# Patient Record
Sex: MALE | URBAN - METROPOLITAN AREA
[De-identification: names, ages, dates, MRNs, and addresses within clinical notes are randomized per-mention and may not be internally consistent; named-entity substitution may affect disease eponyms.]

---

## 2023-06-06 ENCOUNTER — APPOINTMENT (RX ONLY)
Dept: URBAN - METROPOLITAN AREA CLINIC 28 | Facility: CLINIC | Age: 48
Setting detail: DERMATOLOGY
End: 2023-06-06

## 2023-06-06 DIAGNOSIS — L40.0 PSORIASIS VULGARIS: ICD-10-CM | Status: INADEQUATELY CONTROLLED

## 2023-06-06 DIAGNOSIS — L82.1 OTHER SEBORRHEIC KERATOSIS: ICD-10-CM

## 2023-06-06 DIAGNOSIS — Z71.89 OTHER SPECIFIED COUNSELING: ICD-10-CM

## 2023-06-06 DIAGNOSIS — D22 MELANOCYTIC NEVI: ICD-10-CM

## 2023-06-06 DIAGNOSIS — A63.0 ANOGENITAL (VENEREAL) WARTS: ICD-10-CM

## 2023-06-06 DIAGNOSIS — L81.4 OTHER MELANIN HYPERPIGMENTATION: ICD-10-CM

## 2023-06-06 PROBLEM — D48.5 NEOPLASM OF UNCERTAIN BEHAVIOR OF SKIN: Status: ACTIVE | Noted: 2023-06-06

## 2023-06-06 PROBLEM — D22.9 MELANOCYTIC NEVI, UNSPECIFIED: Status: ACTIVE | Noted: 2023-06-06

## 2023-06-06 PROCEDURE — ? TREATMENT REGIMEN

## 2023-06-06 PROCEDURE — ? BENIGN DESTRUCTION (GENITALS)

## 2023-06-06 PROCEDURE — ? PRESCRIPTION MEDICATION MANAGEMENT

## 2023-06-06 PROCEDURE — 11102 TANGNTL BX SKIN SINGLE LES: CPT

## 2023-06-06 PROCEDURE — 99204 OFFICE O/P NEW MOD 45 MIN: CPT | Mod: 25

## 2023-06-06 PROCEDURE — ? BIOPSY BY SHAVE METHOD

## 2023-06-06 PROCEDURE — ? SUNSCREEN RECOMMENDATIONS

## 2023-06-06 PROCEDURE — 54056 CRYOSURGERY PENIS LESION(S): CPT

## 2023-06-06 PROCEDURE — ? PRESCRIPTION

## 2023-06-06 PROCEDURE — ? COUNSELING

## 2023-06-06 PROCEDURE — ? PHOTO-DOCUMENTATION

## 2023-06-06 RX ORDER — TRIAMCINOLONE ACETONIDE 1 MG/G
OINTMENT TOPICAL BID
Qty: 80 | Refills: 2 | Status: ERX | COMMUNITY
Start: 2023-06-06

## 2023-06-06 RX ADMIN — TRIAMCINOLONE ACETONIDE: 1 OINTMENT TOPICAL at 00:00

## 2023-06-06 ASSESSMENT — LOCATION SIMPLE DESCRIPTION DERM
LOCATION SIMPLE: RIGHT FOOT
LOCATION SIMPLE: PENIS
LOCATION SIMPLE: UPPER BACK
LOCATION SIMPLE: LEFT PRETIBIAL REGION
LOCATION SIMPLE: RIGHT PRETIBIAL REGION

## 2023-06-06 ASSESSMENT — LOCATION ZONE DERM
LOCATION ZONE: TRUNK
LOCATION ZONE: LEG
LOCATION ZONE: FEET
LOCATION ZONE: PENIS

## 2023-06-06 ASSESSMENT — LOCATION DETAILED DESCRIPTION DERM
LOCATION DETAILED: RIGHT PROXIMAL PRETIBIAL REGION
LOCATION DETAILED: RIGHT DORSAL SHAFT OF PENIS
LOCATION DETAILED: LEFT PROXIMAL PRETIBIAL REGION
LOCATION DETAILED: RIGHT DORSAL FOOT
LOCATION DETAILED: SUPERIOR THORACIC SPINE

## 2023-06-06 NOTE — PROCEDURE: BENIGN DESTRUCTION (GENITALS)
Post-Care Instructions: I reviewed with the patient in detail post-care instructions. Patient is to wear sunprotection, and avoid picking at any of the treated lesions. Pt may apply Vaseline to crusted or scabbing areas.
Detail Level: Detailed
Warning: This plan will only bill for destructions on the Penis and Vulva. If you select the Scrotum it will not bill.
Consent: The patient's consent was obtained including but not limited to risks of crusting, scabbing, blistering, scarring, darker or lighter pigmentary change, recurrence, incomplete removal and infection.
Render Post-Care Instructions In Note?: no
Method: liquid nitrogen
Anesthesia Volume In Cc: 0.5

## 2023-06-06 NOTE — PROCEDURE: PRESCRIPTION MEDICATION MANAGEMENT
Detail Level: Zone
Initiate Treatment: triamcinolone acetonide 0.1 % topical ointment: Apply to AA of body BID for 2 weeks and the PRN
Render In Strict Bullet Format?: No

## 2023-06-06 NOTE — HPI: RASH
What Type Of Note Output Would You Prefer (Optional)?: Standard Output
Is The Patient Presenting As Previously Scheduled?: Yes
How Severe Is Your Rash?: moderate
Is This A New Presentation, Or A Follow-Up?: Rash
Additional History: Patient states he was in [facility] and was bit by bugs, became itchy. Legs have been itchy here and there ever since.

## 2023-11-07 NOTE — PROCEDURE: MIPS QUALITY
Quality 226: Preventive Care And Screening: Tobacco Use: Screening And Cessation Intervention: Patient screened for tobacco use, is a smoker AND received Cessation Counseling within measurement period or in the six months prior to the measurement period
Quality 130: Documentation Of Current Medications In The Medical Record: Current Medications Documented
Detail Level: Detailed
Quality 431: Preventive Care And Screening: Unhealthy Alcohol Use - Screening: Patient not identified as an unhealthy alcohol user when screened for unhealthy alcohol use using a systematic screening method
Otezla Counseling: The side effects of Otezla were discussed with the patient, including but not limited to worsening or new depression, weight loss, diarrhea, nausea, upper respiratory tract infection, and headache. Patient instructed to call the office should any adverse effect occur.  The patient verbalized understanding of the proper use and possible adverse effects of Otezla.  All the patient's questions and concerns were addressed.

## 2024-02-09 DIAGNOSIS — M79.601 RIGHT ARM PAIN: Primary | ICD-10-CM

## 2024-02-12 ENCOUNTER — HOSPITAL ENCOUNTER (OUTPATIENT)
Dept: RADIOLOGY | Facility: HOSPITAL | Age: 49
Discharge: HOME | End: 2024-02-12
Attending: PHYSICIAN ASSISTANT
Payer: COMMERCIAL

## 2024-02-12 ENCOUNTER — OFFICE VISIT (OUTPATIENT)
Dept: ORTHOPEDICS | Facility: CLINIC | Age: 49
End: 2024-02-12
Payer: COMMERCIAL

## 2024-02-12 DIAGNOSIS — M79.601 RIGHT ARM PAIN: ICD-10-CM

## 2024-02-12 DIAGNOSIS — S42.254A NONDISPLACED FRACTURE OF GREATER TUBEROSITY OF RIGHT HUMERUS, INITIAL ENCOUNTER FOR CLOSED FRACTURE: Primary | ICD-10-CM

## 2024-02-12 PROCEDURE — 99204 OFFICE O/P NEW MOD 45 MIN: CPT | Performed by: PHYSICIAN ASSISTANT

## 2024-02-12 PROCEDURE — 73060 X-RAY EXAM OF HUMERUS: CPT | Mod: RT

## 2024-02-12 NOTE — PROGRESS NOTES
Naveen Villarreal is a right hand dominant 48 y.o. male presenting with right shoulder injury while skiing 3 weeks ago. Their pain is 5/10 and is localized to their Shoulder. It feels sharp and aching. It does not radiate. Better at rest, worse with movement. No fevers or chills. No tingling or numbness.    Review of systems is negative except for what was included in the above HPI.    Past Medical History:   No past medical history on file.    Social History:        Family History:  Noncontributory  No family history on file.     Physical Exam:    Right Upper Extremity: tender to palpation  ROM: full passive ROM intact, active ROM limited to FF 90 deg, Abd 90 deg due to pain  +rad/ain/pin/uln motor  Senation intact to light touch rad/med/uln  Cap refill brisk    Imaging:  Xray of Right Humerus was personally reviewed. My findings are: nondisplaced greater tuberosity fx    Assessment/Plan:  nonop tx  Non-weight bearing right upper extremity  Fu 3 weeks  Xray right shoulder

## 2024-03-01 DIAGNOSIS — S42.254A NONDISPLACED FRACTURE OF GREATER TUBEROSITY OF RIGHT HUMERUS, INITIAL ENCOUNTER FOR CLOSED FRACTURE: Primary | ICD-10-CM

## 2024-03-08 DIAGNOSIS — S42.254A NONDISPLACED FRACTURE OF GREATER TUBEROSITY OF RIGHT HUMERUS, INITIAL ENCOUNTER FOR CLOSED FRACTURE: Primary | ICD-10-CM

## 2024-03-15 DIAGNOSIS — S42.254A NONDISPLACED FRACTURE OF GREATER TUBEROSITY OF RIGHT HUMERUS, INITIAL ENCOUNTER FOR CLOSED FRACTURE: Primary | ICD-10-CM

## 2024-03-15 NOTE — PROGRESS NOTES
Naveen Villarreal is a 48 y.o. male presenting with 7 weeks s/p nonop treatment of right hum greater tuberosity fx. Their pain is 5/10 and is localized to their Shoulder. It feels sharp and aching. It does not radiate. Better at rest, worse with movement. No fevers or chills. No tingling or numbness.    Review of systems is negative except for what was included in the above HPI.    Past Medical History:   No past medical history on file.    Social History:        Family History:  Noncontributory  No family history on file.     Physical Exam:    Right Upper Extremity: tender to palpation  ROM: limited due to pain/immobility, active rom limited to 90 deg abd and FF  (+) empty can test, (+) belly press test  +rad/ain/pin/uln motor  Senation intact to light touch rad/med/uln  Cap refill brisk    Imaging:  Xray of Right Shoulder was personally reviewed. My findings are: fracture in good alignment. interval callus formation present    Assessment/Plan:  5lb weight bearing, advance as tolerated right upper extremity   Fracture improving but still with limited ROM and weakness of rotator cuff muscles so will get MRI to rule out complete tear  PT Rx  Fu 8 weeks or sooner with MRI   Xray right shoulder

## 2024-03-18 ENCOUNTER — HOSPITAL ENCOUNTER (OUTPATIENT)
Dept: RADIOLOGY | Facility: HOSPITAL | Age: 49
Discharge: HOME | End: 2024-03-18
Attending: PHYSICIAN ASSISTANT
Payer: COMMERCIAL

## 2024-03-18 ENCOUNTER — OFFICE VISIT (OUTPATIENT)
Dept: ORTHOPEDICS | Facility: CLINIC | Age: 49
End: 2024-03-18
Payer: COMMERCIAL

## 2024-03-18 DIAGNOSIS — S46.001A INJURY OF RIGHT ROTATOR CUFF, INITIAL ENCOUNTER: ICD-10-CM

## 2024-03-18 DIAGNOSIS — S42.254A NONDISPLACED FRACTURE OF GREATER TUBEROSITY OF RIGHT HUMERUS, INITIAL ENCOUNTER FOR CLOSED FRACTURE: Primary | ICD-10-CM

## 2024-03-18 DIAGNOSIS — S42.254A NONDISPLACED FRACTURE OF GREATER TUBEROSITY OF RIGHT HUMERUS, INITIAL ENCOUNTER FOR CLOSED FRACTURE: ICD-10-CM

## 2024-03-18 PROCEDURE — 73030 X-RAY EXAM OF SHOULDER: CPT | Mod: RT

## 2024-03-18 PROCEDURE — 99213 OFFICE O/P EST LOW 20 MIN: CPT | Performed by: PHYSICIAN ASSISTANT

## 2024-03-19 ENCOUNTER — TELEPHONE (OUTPATIENT)
Dept: ORTHOPEDICS | Facility: CLINIC | Age: 49
End: 2024-03-19
Payer: COMMERCIAL

## 2024-03-26 ENCOUNTER — APPOINTMENT (RX ONLY)
Dept: URBAN - METROPOLITAN AREA CLINIC 28 | Facility: CLINIC | Age: 49
Setting detail: DERMATOLOGY
End: 2024-03-26

## 2024-03-26 DIAGNOSIS — L259 CONTACT DERMATITIS AND OTHER ECZEMA, UNSPECIFIED CAUSE: ICD-10-CM | Status: INADEQUATELY CONTROLLED

## 2024-03-26 PROBLEM — L30.8 OTHER SPECIFIED DERMATITIS: Status: ACTIVE | Noted: 2024-03-26

## 2024-03-26 PROCEDURE — ? PHOTO-DOCUMENTATION

## 2024-03-26 PROCEDURE — ? COUNSELING

## 2024-03-26 PROCEDURE — ? PRESCRIPTION MEDICATION MANAGEMENT

## 2024-03-26 PROCEDURE — 99214 OFFICE O/P EST MOD 30 MIN: CPT

## 2024-03-26 PROCEDURE — ? PRESCRIPTION

## 2024-03-26 RX ORDER — CLOBETASOL PROPIONATE 0.5 MG/G
CREAM TOPICAL BID
Qty: 60 | Refills: 1 | Status: ERX | COMMUNITY
Start: 2024-03-26

## 2024-03-26 RX ADMIN — CLOBETASOL PROPIONATE: 0.5 CREAM TOPICAL at 00:00

## 2024-03-26 ASSESSMENT — LOCATION DETAILED DESCRIPTION DERM
LOCATION DETAILED: LEFT PROXIMAL PRETIBIAL REGION
LOCATION DETAILED: RIGHT PROXIMAL PRETIBIAL REGION

## 2024-03-26 ASSESSMENT — LOCATION SIMPLE DESCRIPTION DERM
LOCATION SIMPLE: LEFT PRETIBIAL REGION
LOCATION SIMPLE: RIGHT PRETIBIAL REGION

## 2024-03-26 ASSESSMENT — LOCATION ZONE DERM: LOCATION ZONE: LEG

## 2024-03-26 NOTE — PROCEDURE: MIPS QUALITY
Detail Level: Detailed
Quality 431: Preventive Care And Screening: Unhealthy Alcohol Use - Screening: Patient did not receive brief counseling if identified as an unhealthy alcohol user
Quality 130: Documentation Of Current Medications In The Medical Record: Current Medications Documented
Quality 226: Preventive Care And Screening: Tobacco Use: Screening And Cessation Intervention: Patient screened for tobacco use, is a smoker AND received Cessation Counseling within measurement period or in the six months prior to the measurement period

## 2024-03-26 NOTE — PROCEDURE: PRESCRIPTION MEDICATION MANAGEMENT
Render In Strict Bullet Format?: No
Detail Level: Zone
Initiate Treatment: clobetasol 0.05 % topical cream: apply to affected area on legs BID x 2 weeks
Defer Treatment (Provide Reason For Deferment In Text Field Below): If no improvement, will consider biopsy.

## 2024-04-19 ENCOUNTER — HOSPITAL ENCOUNTER (OUTPATIENT)
Dept: RADIOLOGY | Facility: HOSPITAL | Age: 49
Discharge: HOME | End: 2024-04-19
Attending: PHYSICIAN ASSISTANT
Payer: COMMERCIAL

## 2024-04-19 DIAGNOSIS — S46.001A INJURY OF RIGHT ROTATOR CUFF, INITIAL ENCOUNTER: ICD-10-CM

## 2024-04-19 PROCEDURE — 73221 MRI JOINT UPR EXTREM W/O DYE: CPT | Mod: RT

## 2024-04-25 DIAGNOSIS — S42.254A NONDISPLACED FRACTURE OF GREATER TUBEROSITY OF RIGHT HUMERUS, INITIAL ENCOUNTER FOR CLOSED FRACTURE: Primary | ICD-10-CM

## 2024-04-25 DIAGNOSIS — S46.011D TRAUMATIC INCOMPLETE TEAR OF RIGHT ROTATOR CUFF, SUBSEQUENT ENCOUNTER: ICD-10-CM

## 2024-04-25 NOTE — PROGRESS NOTES
Naveen Villarreal is a 48 y.o. male presenting with 3 months s/p nonop treatment of Right hum greater tub fx. Their pain is 3/10 and is localized to their Shoulder. It feels aching. It does not radiate. Better at rest, worse with movement. No fevers or chills. No tingling or numbness. Pain and ROM improving with PT.    Review of systems is negative except for what was included in the above HPI.    Past Medical History:   No past medical history on file.    Social History:        Family History:  Noncontributory  No family history on file.     Physical Exam:    Right Upper Extremity: nontender to palpation  ROM: FF: 105 Abd: 105  +rad/ain/pin/uln motor  Senation intact to light touch rad/med/uln  Cap refill brisk    Imaging:  Xray/MRI of Right Shoulder was personally reviewed. My findings are: fracture in good alignment. interval callus formation present.  Partial tear of distal supraspinatus tendon    Assessment/Plan:  Weight bearing as tolerated right upper extremity  No complete rotator cuff tear - continue treating with PT and reassess  PT Rx  Fu 8 weeks  Xray right shoulder

## 2024-04-29 ENCOUNTER — OFFICE VISIT (OUTPATIENT)
Dept: ORTHOPEDICS | Facility: CLINIC | Age: 49
End: 2024-04-29
Payer: COMMERCIAL

## 2024-04-29 ENCOUNTER — HOSPITAL ENCOUNTER (OUTPATIENT)
Dept: RADIOLOGY | Facility: HOSPITAL | Age: 49
Discharge: HOME | End: 2024-04-29
Attending: PHYSICIAN ASSISTANT
Payer: COMMERCIAL

## 2024-04-29 DIAGNOSIS — S46.012A TRAUMATIC INCOMPLETE TEAR OF LEFT ROTATOR CUFF, INITIAL ENCOUNTER: ICD-10-CM

## 2024-04-29 DIAGNOSIS — S42.254A NONDISPLACED FRACTURE OF GREATER TUBEROSITY OF RIGHT HUMERUS, INITIAL ENCOUNTER FOR CLOSED FRACTURE: ICD-10-CM

## 2024-04-29 DIAGNOSIS — S42.254A NONDISPLACED FRACTURE OF GREATER TUBEROSITY OF RIGHT HUMERUS, INITIAL ENCOUNTER FOR CLOSED FRACTURE: Primary | ICD-10-CM

## 2024-04-29 DIAGNOSIS — S46.011D TRAUMATIC INCOMPLETE TEAR OF RIGHT ROTATOR CUFF, SUBSEQUENT ENCOUNTER: ICD-10-CM

## 2024-04-29 PROCEDURE — 99214 OFFICE O/P EST MOD 30 MIN: CPT | Performed by: PHYSICIAN ASSISTANT

## 2024-04-29 PROCEDURE — 73030 X-RAY EXAM OF SHOULDER: CPT | Mod: RT

## 2024-06-20 DIAGNOSIS — S42.254A NONDISPLACED FRACTURE OF GREATER TUBEROSITY OF RIGHT HUMERUS, INITIAL ENCOUNTER FOR CLOSED FRACTURE: Primary | ICD-10-CM

## 2024-07-19 DIAGNOSIS — S42.254A NONDISPLACED FRACTURE OF GREATER TUBEROSITY OF RIGHT HUMERUS, INITIAL ENCOUNTER FOR CLOSED FRACTURE: Primary | ICD-10-CM

## 2024-07-19 NOTE — PROGRESS NOTES
Naveen Villarreal is a 48 y.o. male presenting with 5.5 months s/p nonop tx of Right hum greater tub fx and partial tear of distal supraspinatus tendon. Their pain is 1/10 and is localized to their Shoulder. It feels dull. It does not radiate. Better at rest, worse with movement. No fevers or chills. No tingling or numbness.    Review of systems is negative except for what was included in the above HPI.    Past Medical History:   No past medical history on file.    Social History:        Family History:  Noncontributory  No family history on file.     Physical Exam:    Right Upper Extremity: nontender to palpation  ROM: full, painless ROM  +rad/ain/pin/uln motor  Senation intact to light touch rad/med/uln  Cap refill brisk    Imaging:  Xray of Right Shoulder was personally reviewed. My findings are:  fracture in good alignment. fracture healed    Assessment/Plan:  Weight bearing as tolerated right upper extremity  PT Rx  Fu prn

## 2024-07-22 ENCOUNTER — HOSPITAL ENCOUNTER (OUTPATIENT)
Dept: RADIOLOGY | Facility: HOSPITAL | Age: 49
Discharge: HOME | End: 2024-07-22
Attending: PHYSICIAN ASSISTANT
Payer: COMMERCIAL

## 2024-07-22 ENCOUNTER — OFFICE VISIT (OUTPATIENT)
Dept: ORTHOPEDICS | Facility: CLINIC | Age: 49
End: 2024-07-22
Payer: COMMERCIAL

## 2024-07-22 DIAGNOSIS — S42.254A NONDISPLACED FRACTURE OF GREATER TUBEROSITY OF RIGHT HUMERUS, INITIAL ENCOUNTER FOR CLOSED FRACTURE: Primary | ICD-10-CM

## 2024-07-22 DIAGNOSIS — S42.254A NONDISPLACED FRACTURE OF GREATER TUBEROSITY OF RIGHT HUMERUS, INITIAL ENCOUNTER FOR CLOSED FRACTURE: ICD-10-CM

## 2024-07-22 DIAGNOSIS — S46.012A TRAUMATIC INCOMPLETE TEAR OF LEFT ROTATOR CUFF, INITIAL ENCOUNTER: ICD-10-CM

## 2024-07-22 PROCEDURE — 73030 X-RAY EXAM OF SHOULDER: CPT | Mod: RT

## 2024-07-22 PROCEDURE — 99213 OFFICE O/P EST LOW 20 MIN: CPT | Performed by: PHYSICIAN ASSISTANT
